# Patient Record
Sex: FEMALE | Race: WHITE
[De-identification: names, ages, dates, MRNs, and addresses within clinical notes are randomized per-mention and may not be internally consistent; named-entity substitution may affect disease eponyms.]

---

## 2021-07-30 ENCOUNTER — HOSPITAL ENCOUNTER (EMERGENCY)
Dept: HOSPITAL 47 - EC | Age: 26
Discharge: HOME | End: 2021-07-30
Payer: COMMERCIAL

## 2021-07-30 VITALS — RESPIRATION RATE: 20 BRPM | TEMPERATURE: 99 F

## 2021-07-30 VITALS — SYSTOLIC BLOOD PRESSURE: 131 MMHG | DIASTOLIC BLOOD PRESSURE: 72 MMHG | HEART RATE: 96 BPM

## 2021-07-30 LAB
ALBUMIN SERPL-MCNC: 4.2 G/DL (ref 3.5–5)
ALP SERPL-CCNC: 86 U/L (ref 38–126)
ALT SERPL-CCNC: 16 U/L (ref 4–34)
ANION GAP SERPL CALC-SCNC: 12 MMOL/L
AST SERPL-CCNC: 23 U/L (ref 14–36)
BASOPHILS # BLD AUTO: 0 K/UL (ref 0–0.2)
BASOPHILS NFR BLD AUTO: 0 %
BUN SERPL-SCNC: 11 MG/DL (ref 7–17)
CALCIUM SPEC-MCNC: 9.5 MG/DL (ref 8.4–10.2)
CHLORIDE SERPL-SCNC: 108 MMOL/L (ref 98–107)
CO2 SERPL-SCNC: 20 MMOL/L (ref 22–30)
EOSINOPHIL # BLD AUTO: 0.2 K/UL (ref 0–0.7)
EOSINOPHIL NFR BLD AUTO: 2 %
ERYTHROCYTE [DISTWIDTH] IN BLOOD BY AUTOMATED COUNT: 3.54 M/UL (ref 3.8–5.4)
ERYTHROCYTE [DISTWIDTH] IN BLOOD: 13.6 % (ref 11.5–15.5)
GLUCOSE SERPL-MCNC: 89 MG/DL (ref 74–99)
HCT VFR BLD AUTO: 33.1 % (ref 34–46)
HGB BLD-MCNC: 11.3 GM/DL (ref 11.4–16)
LYMPHOCYTES # SPEC AUTO: 2.3 K/UL (ref 1–4.8)
LYMPHOCYTES NFR SPEC AUTO: 20 %
MCH RBC QN AUTO: 31.9 PG (ref 25–35)
MCHC RBC AUTO-ENTMCNC: 34.1 G/DL (ref 31–37)
MCV RBC AUTO: 93.7 FL (ref 80–100)
MONOCYTES # BLD AUTO: 0.3 K/UL (ref 0–1)
MONOCYTES NFR BLD AUTO: 3 %
NEUTROPHILS # BLD AUTO: 8.6 K/UL (ref 1.3–7.7)
NEUTROPHILS NFR BLD AUTO: 74 %
PH UR: 7.5 [PH] (ref 5–8)
PLATELET # BLD AUTO: 254 K/UL (ref 150–450)
POTASSIUM SERPL-SCNC: 4 MMOL/L (ref 3.5–5.1)
PROT SERPL-MCNC: 6.8 G/DL (ref 6.3–8.2)
RBC UR QL: >182 /HPF (ref 0–5)
SODIUM SERPL-SCNC: 140 MMOL/L (ref 137–145)
SP GR UR: 1.01 (ref 1–1.03)
UROBILINOGEN UR QL STRIP: <2 MG/DL (ref ?–2)
WBC # BLD AUTO: 11.6 K/UL (ref 3.8–10.6)
WBC # UR AUTO: 69 /HPF (ref 0–5)

## 2021-07-30 PROCEDURE — 85025 COMPLETE CBC W/AUTO DIFF WBC: CPT

## 2021-07-30 PROCEDURE — 36415 COLL VENOUS BLD VENIPUNCTURE: CPT

## 2021-07-30 PROCEDURE — 87086 URINE CULTURE/COLONY COUNT: CPT

## 2021-07-30 PROCEDURE — 81001 URINALYSIS AUTO W/SCOPE: CPT

## 2021-07-30 PROCEDURE — 80053 COMPREHEN METABOLIC PANEL: CPT

## 2021-07-30 PROCEDURE — 86900 BLOOD TYPING SEROLOGIC ABO: CPT

## 2021-07-30 PROCEDURE — 76856 US EXAM PELVIC COMPLETE: CPT

## 2021-07-30 PROCEDURE — 99285 EMERGENCY DEPT VISIT HI MDM: CPT

## 2021-07-30 PROCEDURE — 93975 VASCULAR STUDY: CPT

## 2021-07-30 PROCEDURE — 86901 BLOOD TYPING SEROLOGIC RH(D): CPT

## 2021-07-30 NOTE — US
EXAMINATION TYPE: US pelvic complete

 

DATE OF EXAM: 7/30/2021

 

COMPARISON: NONE

 

CLINICAL HISTORY: Postpartum 2 weeks, increase bleeding/pain. 

 

TECHNIQUE:  Transabdominal sonographic images of the pelvis were acquired.  Transvaginal sonographic 
images not performed due to patient being 2 weeks postpartum 

 

 

EXAM MEASUREMENTS:

 

Uterus:  13.5 x 8.0 x 9.3 cm

Endometrial Stripe: 2.7 cm

Right Ovary:  2.1 x 1.2 x 1.6 cm

Left Ovary:  2.5 x 1.9 x 2.7 cm

 

 

 

1. Uterus:  Anteverted  

2. Endometrium:  Thickened and complex

3. Right Ovary:  wnl

4. Left Ovary:  wnl

**Spectral, color and waveform doppler imaging shows good arterial and venous flow within the ovaries
; there is no evidence for ovarian torsion.

5. Bilateral Adnexa:  wnl

6. Posterior cul-de-sac:  wnl

 

 

IMPRESSION:

 

2.7 cm endometrial thickening with small fluid. No definite focal lesion or significant color Doppler
 abnormality seen to suggest retained product of conception. Findings are nonspecific. Recommend cont
inued clinical follow-up and imaging as indicated.

## 2021-07-30 NOTE — ED
Female Urogenital HPI





- General


Chief complaint: Vaginal Bleeding


Stated complaint: ABD pain/Vag bleeding


Time Seen by Provider: 07/30/21 16:50


Source: patient, EMS


Mode of arrival: EMS


Limitations: no limitations





- History of Present Illness


Initial comments: 





Patient is a 25-year-old female presenting to the emergency department via EMS 

of her complaints of vaginal bleeding and increased lower abdominal cramping 

today.  Patient is currently 2 weeks postpartum, normal vaginal delivery at home

with a midwife.  She was 38 weeks along, patient did state that the baby was 

breech, she had some small tears but otherwise no complication.  She has been ha

ving some bleeding over the past 2 weeks and then today she thought she had had 

a bowel movement, sat on toilet and passed a large blood clot.  Patient got very

concerned and called EMS.  She states she is having some lower abdominal 

cramping that feels like mild contractions however she's been having these ever 

since she delivered.  This is her first pregnancy.  She had 1 ultrasound 

previously at about 20 weeks along that showed no abnormalities.  Patient states

her blood type is O+.  She denies any fevers or chills, no dysuria, no chest 

pain or shortness of breath, no recent cough or congestion.  She denies any 

previous abdominal surgeries.  Is no further complaints at this time.  Upon arr

ival to the ER her vitals are stable.


Last Menstrual Period: 07/30/21





- Related Data


                                  Previous Rx's











 Medication  Instructions  Recorded


 


Cephalexin [Keflex] 500 mg PO BID 5 Days #10 cap 07/30/21











                                    Allergies











Allergy/AdvReac Type Severity Reaction Status Date / Time


 


No Known Allergies Allergy   Verified 07/30/21 16:29














Review of Systems


ROS Statement: 


Those systems with pertinent positive or pertinent negative responses have been 

documented in the HPI.





ROS Other: All systems not noted in ROS Statement are negative.





Past Medical History


Past Medical History: No Reported History


History of Any Multi-Drug Resistant Organisms: None Reported


Past Surgical History: No Surgical Hx Reported


Past Psychological History: No Psychological Hx Reported


Smoking Status: Never smoker


Past Alcohol Use History: None Reported





General Exam





- General Exam Comments


Initial Comments: 





GENERAL: 


Patient is well-developed and well-nourished.  Patient is nontoxic and in mild d

istress.





HEAD: 


Atraumatic, normocephalic.





EYES:


Pupils equal round and reactive to light, extraocular movements intact, sclera 

anicteric, conjunctiva are normal.  Eyelids were unremarkable.





ENT: 


TMs normal, nares patent, oropharynx clear without exudates.  Moist mucous 

membranes.





NECK: 


Normal range of motion, supple without lymphadenopathy or JVD.





LUNGS:


Unlabored respirations.  Breath sounds clear to auscultation bilaterally and 

equal.  No wheezes rales or rhonchi.





HEART:


Regular rate and rhythm without murmurs, rubs or gallops.





ABDOMEN: 


Soft, mild lower discomfort with palpation, uterus feels soft, consistent with 2

 weeks' postpartum.  Normal active bowel sounds, no guarding, no masses felt.








MUSCULOSKELETAL: 


Normal extremities with adequate strength and normal range of motion, no pitting

 or edema.  No clubbing or cyanosis.





NEUROLOGICAL: 


Patient is alert and oriented x 3.  





SKIN:


 Warm, Dry, normal turgor, no rashes or lesions noted.


Limitations: no limitations


External exam: Present: normal external exam


Speculum exam: Present: vaginal bleeding (Mild, no hemorrhage.)


By manual exam: Present: normal by manual exam





Course


                                   Vital Signs











  07/30/21





  16:25


 


Temperature 99 F


 


Pulse Rate 91


 


Respiratory 20





Rate 


 


Blood Pressure 125/87


 


O2 Sat by Pulse 98





Oximetry 














Medical Decision Making





- Medical Decision Making





Patient is a 25-year-old female presenting via EMS over passing a large blood 

clot and lower abdominal cramping.  She is 2 weeks postpartum.  Normal vaginal 

delivery at home with a midwife at 38 weeks along.  This was her first 

pregnancy.  Vital signs are stable, lab work shows a normal hemoglobin at 11.3, 

urine shows large amount of blood, 69 wbc's.  Ultrasound of the pelvis shows a 

2.7 cm endometrial thickening with small fluid, no definite focal lesion or 

abnormality to suggest retained products.  Recomended follow-up.  I did give 

patient some ibuprofen.  We discussed alternating between ibuprofen and Tylenol 

for her discomfort.  She is stable for discharge.  Recommend following up with 

her midwife.  She is agreeable to this.  Return parameters were discussed with 

her and she verbalized understanding.  Case discussed with Dr. Crsytal. 





- Lab Data


Result diagrams: 


                                 07/30/21 17:01 07/30/21 17:01


                                   Lab Results











  07/30/21 07/30/21 07/30/21 Range/Units





  17:01 17:01 17:01 


 


WBC  11.6 H    (3.8-10.6)  k/uL


 


RBC  3.54 L    (3.80-5.40)  m/uL


 


Hgb  11.3 L    (11.4-16.0)  gm/dL


 


Hct  33.1 L    (34.0-46.0)  %


 


MCV  93.7    (80.0-100.0)  fL


 


MCH  31.9    (25.0-35.0)  pg


 


MCHC  34.1    (31.0-37.0)  g/dL


 


RDW  13.6    (11.5-15.5)  %


 


Plt Count  254    (150-450)  k/uL


 


MPV  7.9    


 


Neutrophils %  74    %


 


Lymphocytes %  20    %


 


Monocytes %  3    %


 


Eosinophils %  2    %


 


Basophils %  0    %


 


Neutrophils #  8.6 H    (1.3-7.7)  k/uL


 


Lymphocytes #  2.3    (1.0-4.8)  k/uL


 


Monocytes #  0.3    (0-1.0)  k/uL


 


Eosinophils #  0.2    (0-0.7)  k/uL


 


Basophils #  0.0    (0-0.2)  k/uL


 


Sodium    140  (137-145)  mmol/L


 


Potassium    4.0  (3.5-5.1)  mmol/L


 


Chloride    108 H  ()  mmol/L


 


Carbon Dioxide    20 L  (22-30)  mmol/L


 


Anion Gap    12  mmol/L


 


BUN    11  (7-17)  mg/dL


 


Creatinine    0.62  (0.52-1.04)  mg/dL


 


Est GFR (CKD-EPI)AfAm    >90  (>60 ml/min/1.73 sqM)  


 


Est GFR (CKD-EPI)NonAf    >90  (>60 ml/min/1.73 sqM)  


 


Glucose    89  (74-99)  mg/dL


 


Calcium    9.5  (8.4-10.2)  mg/dL


 


Total Bilirubin    0.2  (0.2-1.3)  mg/dL


 


AST    23  (14-36)  U/L


 


ALT    16  (4-34)  U/L


 


Alkaline Phosphatase    86  ()  U/L


 


Total Protein    6.8  (6.3-8.2)  g/dL


 


Albumin    4.2  (3.5-5.0)  g/dL


 


Urine Color   Yellow   


 


Urine Appearance   Clear   (Clear)  


 


Urine pH   7.5   (5.0-8.0)  


 


Ur Specific Gravity   1.012   (1.001-1.035)  


 


Urine Protein   Trace H   (Negative)  


 


Urine Glucose (UA)   Negative   (Negative)  


 


Urine Ketones   Negative   (Negative)  


 


Urine Blood   Large H   (Negative)  


 


Urine Nitrite   Negative   (Negative)  


 


Urine Bilirubin   Negative   (Negative)  


 


Urine Urobilinogen   <2.0   (<2.0)  mg/dL


 


Ur Leukocyte Esterase   Large H   (Negative)  


 


Urine RBC   >182 H   (0-5)  /hpf


 


Urine WBC   69 H   (0-5)  /hpf


 


Blood Type     


 


Blood Type Recheck     


 


Bld Type Recheck Status     














  07/30/21 Range/Units





  17:01 


 


WBC   (3.8-10.6)  k/uL


 


RBC   (3.80-5.40)  m/uL


 


Hgb   (11.4-16.0)  gm/dL


 


Hct   (34.0-46.0)  %


 


MCV   (80.0-100.0)  fL


 


MCH   (25.0-35.0)  pg


 


MCHC   (31.0-37.0)  g/dL


 


RDW   (11.5-15.5)  %


 


Plt Count   (150-450)  k/uL


 


MPV   


 


Neutrophils %   %


 


Lymphocytes %   %


 


Monocytes %   %


 


Eosinophils %   %


 


Basophils %   %


 


Neutrophils #   (1.3-7.7)  k/uL


 


Lymphocytes #   (1.0-4.8)  k/uL


 


Monocytes #   (0-1.0)  k/uL


 


Eosinophils #   (0-0.7)  k/uL


 


Basophils #   (0-0.2)  k/uL


 


Sodium   (137-145)  mmol/L


 


Potassium   (3.5-5.1)  mmol/L


 


Chloride   ()  mmol/L


 


Carbon Dioxide   (22-30)  mmol/L


 


Anion Gap   mmol/L


 


BUN   (7-17)  mg/dL


 


Creatinine   (0.52-1.04)  mg/dL


 


Est GFR (CKD-EPI)AfAm   (>60 ml/min/1.73 sqM)  


 


Est GFR (CKD-EPI)NonAf   (>60 ml/min/1.73 sqM)  


 


Glucose   (74-99)  mg/dL


 


Calcium   (8.4-10.2)  mg/dL


 


Total Bilirubin   (0.2-1.3)  mg/dL


 


AST   (14-36)  U/L


 


ALT   (4-34)  U/L


 


Alkaline Phosphatase   ()  U/L


 


Total Protein   (6.3-8.2)  g/dL


 


Albumin   (3.5-5.0)  g/dL


 


Urine Color   


 


Urine Appearance   (Clear)  


 


Urine pH   (5.0-8.0)  


 


Ur Specific Gravity   (1.001-1.035)  


 


Urine Protein   (Negative)  


 


Urine Glucose (UA)   (Negative)  


 


Urine Ketones   (Negative)  


 


Urine Blood   (Negative)  


 


Urine Nitrite   (Negative)  


 


Urine Bilirubin   (Negative)  


 


Urine Urobilinogen   (<2.0)  mg/dL


 


Ur Leukocyte Esterase   (Negative)  


 


Urine RBC   (0-5)  /hpf


 


Urine WBC   (0-5)  /hpf


 


Blood Type  O Positive  


 


Blood Type Recheck  No Previous Record  


 


Bld Type Recheck Status  [facility] ONLY  














Disposition


Clinical Impression: 


 Postpartum bleeding, Bilateral lower abdominal cramping, UTI (urinary tract 

infection)





Disposition: HOME SELF-CARE


Condition: Stable


Instructions (If sedation given, give patient instructions):  Postpartum 

Bleeding (ED)


Additional Instructions: 


Please return to the Emergency Department if symptoms worsen or any other 

concerns.


Follow-up with your midwife.  Continue to drink plenty of fluids. 


Alternate between Tylenol and Motrin for discomfort.


Take antibiotics as prescribed.


Prescriptions: 


Cephalexin [Keflex] 500 mg PO BID 5 Days #10 cap


Is patient prescribed a controlled substance at d/c from ED?: No


Referrals: 


None,Stated [Primary Care Provider] - 1-2 days


Time of Disposition: 18:06